# Patient Record
Sex: FEMALE | Race: WHITE | Employment: UNEMPLOYED | URBAN - METROPOLITAN AREA
[De-identification: names, ages, dates, MRNs, and addresses within clinical notes are randomized per-mention and may not be internally consistent; named-entity substitution may affect disease eponyms.]

---

## 2018-05-02 ENCOUNTER — DOCUMENTATION ONLY (OUTPATIENT)
Dept: FAMILY MEDICINE CLINIC | Age: 23
End: 2018-05-02

## 2018-05-02 NOTE — PROGRESS NOTES
5/2/2018  10:52 AM    Chief Complaint   Patient presents with    Other     patient no longer following at practice       Patient has not been seen in office in over 2 years. Last visit 2/11/16. Letter has been sent to regarding making an appointment with no response. Removed self as PCP. Patient may still be seen in future in office and reassigned if patient desires.